# Patient Record
Sex: MALE
[De-identification: names, ages, dates, MRNs, and addresses within clinical notes are randomized per-mention and may not be internally consistent; named-entity substitution may affect disease eponyms.]

---

## 2021-05-18 ENCOUNTER — TRANSCRIPTION ENCOUNTER (OUTPATIENT)
Age: 31
End: 2021-05-18

## 2021-05-21 ENCOUNTER — TRANSCRIPTION ENCOUNTER (OUTPATIENT)
Age: 31
End: 2021-05-21

## 2023-06-30 ENCOUNTER — APPOINTMENT (OUTPATIENT)
Dept: ORTHOPEDIC SURGERY | Facility: CLINIC | Age: 33
End: 2023-06-30
Payer: COMMERCIAL

## 2023-06-30 VITALS — HEIGHT: 68 IN | WEIGHT: 250 LBS | BODY MASS INDEX: 37.89 KG/M2

## 2023-06-30 DIAGNOSIS — S93.402A SPRAIN OF UNSPECIFIED LIGAMENT OF LEFT ANKLE, INITIAL ENCOUNTER: ICD-10-CM

## 2023-06-30 PROBLEM — Z00.00 ENCOUNTER FOR PREVENTIVE HEALTH EXAMINATION: Status: ACTIVE | Noted: 2023-06-30

## 2023-06-30 PROCEDURE — 99203 OFFICE O/P NEW LOW 30 MIN: CPT

## 2023-06-30 PROCEDURE — 73610 X-RAY EXAM OF ANKLE: CPT | Mod: LT

## 2023-06-30 NOTE — PHYSICAL EXAM
[de-identified] : Physical exam of the left ankle:\par -No ecchymosis, edema, erythema present.  Skin intact\par -No TTP of the left ankle or foot\par -Patient has full range of motion of ankle, pain with inversion \par -+2 dorsalis pedis pulse \par -Sensation intact to light touch

## 2023-06-30 NOTE — HISTORY OF PRESENT ILLNESS
[de-identified] : 32-year-old male presents with left ankle pain.  Patient states his ankle was sore then approximately 3 weeks ago, he was at a wedding, and had a lot of pain since then.  Patient has sharp pain sometimes and other times it feels like an ache.  Pain is worse with weightbearing.  For pain relief, patient wears an ankle brace, and takes anti-inflammatories.  Patient denies any past injuries or surgeries to the left ankle.  Patient admits by having the ankle pain and compensating, his left knee started to bother him as well.

## 2023-06-30 NOTE — DISCUSSION/SUMMARY
[de-identified] : Pt has a sprain of the left ankle.  At this time, I suggested that patient start physical therapy to work on strengthening, stretching, and range of motion for pain relief and for further prevention of this injury happening again.  Patient was also encouraged to modify his activity, can continue wearing the ankle brace, taking anti-inflammatories as needed.  Patient will follow-up in about 6 weeks, at that time if he is not doing better and MRI may be warranted.  Patient is agreeable to this plan all questions were answered today.

## 2023-06-30 NOTE — DATA REVIEWED
[FreeTextEntry1] : Xray images were obtained at the office today. AP, lat, obl views of the left ankle reveal no acute fractures, dislocations, bony abnormalities

## 2023-07-27 ENCOUNTER — APPOINTMENT (OUTPATIENT)
Dept: ORTHOPEDIC SURGERY | Facility: CLINIC | Age: 33
End: 2023-07-27